# Patient Record
Sex: FEMALE | Race: WHITE | NOT HISPANIC OR LATINO | URBAN - METROPOLITAN AREA
[De-identification: names, ages, dates, MRNs, and addresses within clinical notes are randomized per-mention and may not be internally consistent; named-entity substitution may affect disease eponyms.]

---

## 2022-05-10 ENCOUNTER — EMERGENCY (EMERGENCY)
Facility: HOSPITAL | Age: 47
LOS: 1 days | Discharge: HOME | End: 2022-05-10
Attending: EMERGENCY MEDICINE | Admitting: EMERGENCY MEDICINE
Payer: COMMERCIAL

## 2022-05-10 VITALS
HEART RATE: 73 BPM | OXYGEN SATURATION: 98 % | DIASTOLIC BLOOD PRESSURE: 98 MMHG | HEIGHT: 68 IN | RESPIRATION RATE: 18 BRPM | SYSTOLIC BLOOD PRESSURE: 142 MMHG | WEIGHT: 197.98 LBS | TEMPERATURE: 98 F

## 2022-05-10 DIAGNOSIS — R00.1 BRADYCARDIA, UNSPECIFIED: ICD-10-CM

## 2022-05-10 DIAGNOSIS — J20.8 ACUTE BRONCHITIS DUE TO OTHER SPECIFIED ORGANISMS: ICD-10-CM

## 2022-05-10 DIAGNOSIS — U07.1 COVID-19: ICD-10-CM

## 2022-05-10 DIAGNOSIS — Z88.0 ALLERGY STATUS TO PENICILLIN: ICD-10-CM

## 2022-05-10 PROCEDURE — 99285 EMERGENCY DEPT VISIT HI MDM: CPT

## 2022-05-10 PROCEDURE — 93010 ELECTROCARDIOGRAM REPORT: CPT | Mod: NC

## 2022-05-10 PROCEDURE — 99284 EMERGENCY DEPT VISIT MOD MDM: CPT

## 2022-05-10 PROCEDURE — 93005 ELECTROCARDIOGRAM TRACING: CPT

## 2022-05-10 RX ORDER — METOPROLOL TARTRATE 50 MG
0.5 TABLET ORAL
Qty: 60 | Refills: 0
Start: 2022-05-10 | End: 2022-06-08

## 2022-05-10 NOTE — ED PROVIDER NOTE - PHYSICAL EXAMINATION
CONSTITUTIONAL: Well-appearing though tired, in no apparent distress.   HEAD: Normocephalic; atraumatic.   EYES:  conjunctiva and sclera clear  ENT: normal nose; no rhinorrhea; normal pharynx with no erythema or lesions.   NECK: Supple; non-tender;   CARDIOVASCULAR: Normal S1, S2; no murmurs, rubs, or gallops. Regular rate and rhythm.   RESPIRATORY: Breathing easily; breath sounds clear and equal bilaterally; no wheezes, rhonchi, or rales.  GI: Soft; non-distended; non-tender; no palpable organomegaly.   EXT: No cyanosis or edema; N/V intact  SKIN: Normal for age and race; warm; dry; good turgor; no apparent lesions or rash.   NEURO: A & O x 3; face symmetric; grossly unremarkable.   PSYCHOLOGICAL: The patient’s mood and manner are appropriate.

## 2022-05-10 NOTE — ED PROVIDER NOTE - OBJECTIVE STATEMENT
47yo F hx of tachycardia on metoprolol, here with COVID-19 infectino, while self monitoring noting her HR dipping into 50s.  States she went to the urgent care and heart rate was confirmed in the 50s. States she normally has a heart rate in the 60-70s with the metoprolol on board.  Also was diagnosed with COVID 4 days ago. States she feels clammy and tired, very fatigued. no syncope or dizziness. States her cardiologist trying to wean her down on metoprolol dose but had issues w/ rebound tachycardia in the past, so they are going slowly.   · Negative Findings: no back pain, no chest pain  · Timing: sudden onset  · Duration: today  · Context: stress

## 2022-05-10 NOTE — ED ADULT NURSE NOTE - OBJECTIVE STATEMENT
Pt presents with a low HR. States she went to the urgent care and heart rate was in the 50s. States she normally has a heart rate in the 60s. States she also is on metoprolol. Also was diagnosed with COVID 4 days ago. States she feels clammy.

## 2022-05-10 NOTE — ED PROVIDER NOTE - NSFOLLOWUPINSTRUCTIONS_ED_ALL_ED_FT
I have prescribed 1 months worth of metoprolol 25mg that can be taken 4 times daily. You can break these in half if needed. Please call your doctor to help titrate the medication as needed     Your EKG today showed a low heart rate (bradycardia), but no arrythmia, so it is not dangerous, and likely medication/illness related.      Bradycardia, Adult      Bradycardia is a slower-than-normal heartbeat. A normal resting heart rate for an adult ranges from 60 to 100 beats per minute. With bradycardia, the resting heart rate is less than 60 beats per minute.    Bradycardia can prevent enough oxygen from reaching certain areas of your body when you are active. It can be serious if it keeps enough oxygen from reaching your brain and other parts of your body. Bradycardia is not a problem for everyone. For some healthy adults, a slow resting heart rate is normal.      What are the causes?     This condition may be caused by:•A problem with the heart, including:  •A problem with the heart's electrical system, such as a heart block. With a heart block, electrical signals between the chambers of the heart are partially or completely blocked, so they are not able to work as they should.      •A problem with the heart's natural pacemaker (sinus node).      •Heart disease.      •A heart attack.      •Heart damage.      •Lyme disease.      •A heart infection.      •A heart condition that is present at birth (congenital heart defect).        •Certain medicines that treat heart conditions.      •Certain conditions, such as hypothyroidism and obstructive sleep apnea.      •Problems with the balance of chemicals and other substances, like potassium, in the blood.      •Trauma.      •Radiation therapy.        What increases the risk?    You are more likely to develop this condition if you:  •Are age 65 or older.      •Have high blood pressure (hypertension), high cholesterol (hyperlipidemia), or diabetes.      •Drink heavily, use tobacco or nicotine products, or use drugs.        What are the signs or symptoms?    Symptoms of this condition include:  •Light-headedness.      •Feeling faint or fainting.      •Fatigue and weakness.      •Trouble with activity or exercise.      •Shortness of breath.      •Chest pain (angina).      •Drowsiness.      •Confusion.      •Dizziness.        How is this diagnosed?    This condition may be diagnosed based on:  •Your symptoms.      •Your medical history.      •A physical exam.      During the exam, your health care provider will listen to your heartbeat and check your pulse. To confirm the diagnosis, your health care provider may order tests, such as:  •Blood tests.      •An electrocardiogram (ECG). This test records the heart's electrical activity. The test can show how fast your heart is beating and whether the heartbeat is steady.      •A test in which you wear a portable device (event recorder or Holter monitor) to record your heart's electrical activity while you go about your day.      •An exercise test.        How is this treated?    Treatment for this condition depends on the cause of the condition and how severe your symptoms are. Treatment may involve:  •Treatment of the underlying condition.      •Changing your medicines or how much medicine you take.      •Having a small, battery-operated device called a pacemaker implanted under the skin. When bradycardia occurs, this device can be used to increase your heart rate and help your heart beat in a regular rhythm.        Follow these instructions at home:      Lifestyle      •Manage any health conditions that contribute to bradycardia as told by your health care provider.      •Follow a heart-healthy diet. A nutrition specialist (dietitian) can help educate you about healthy food options and changes.      •Follow an exercise program that is approved by your health care provider.      •Maintain a healthy weight.      •Try to reduce or manage your stress, such as with yoga or meditation. If you need help reducing stress, ask your health care provider.      • Do not use any products that contain nicotine or tobacco, such as cigarettes, e-cigarettes, and chewing tobacco. If you need help quitting, ask your health care provider.      • Do not use illegal drugs.      •Limit alcohol intake to no more than 1 drink a day for nonpregnant women and 2 drinks a day for men. Be aware of how much alcohol is in your drink. In the [place] one drink equals one 12 oz bottle of beer (355 mL), one 5 oz glass of wine (148 mL), or one 1½ oz glass of hard liquor (44 mL).      General instructions     •Take over-the-counter and prescription medicines only as told by your health care provider.      •Keep all follow-up visits as told by your health care provider. This is important.        How is this prevented?    In some cases, bradycardia may be prevented by:  •Treating underlying medical problems.      •Stopping behaviors or medicines that can trigger the condition.        Contact a health care provider if you:    •Feel light-headed or dizzy.      •Almost faint.      •Feel weak or are easily fatigued during physical activity.      •Experience confusion or have memory problems.        Get help right away if:    •You faint.    •You have:  •An irregular heartbeat (palpitations).      •Chest pain.      •Trouble breathing.          Summary    •Bradycardia is a slower-than-normal heartbeat. With bradycardia, the resting heart rate is less than 60 beats per minute.      •Treatment for this condition depends on the cause.      •Manage any health conditions that contribute to bradycardia as told by your health care provider.      • Do not use any products that contain nicotine or tobacco, such as cigarettes, e-cigarettes, and chewing tobacco, and limit alcohol intake.      •Keep all follow-up visits as told by your health care provider. This is important.

## 2022-05-10 NOTE — ED PROVIDER NOTE - CARE PLAN
1 Principal Discharge DX:	Acute bronchitis due to COVID-19 virus  Secondary Diagnosis:	Sinus bradycardia

## 2022-05-10 NOTE — ED PROVIDER NOTE - CLINICAL SUMMARY MEDICAL DECISION MAKING FREE TEXT BOX
very well appearing with otherwise normal VS, amb 02 96%  unable to break her metoprolol tabs as ER - will rx IR instead and she will call her doctor to help titrate, already trying to wean off the dose

## 2022-05-10 NOTE — ED PROVIDER NOTE - PATIENT PORTAL LINK FT
You can access the FollowMyHealth Patient Portal offered by [facility] by registering at the following website: [email or web address]. By joining [facility]’s FollowMyHealth portal, you will also be able to view your health information using other applications (apps) compatible with our system.